# Patient Record
Sex: MALE | Race: WHITE | NOT HISPANIC OR LATINO | Employment: OTHER | URBAN - METROPOLITAN AREA
[De-identification: names, ages, dates, MRNs, and addresses within clinical notes are randomized per-mention and may not be internally consistent; named-entity substitution may affect disease eponyms.]

---

## 2017-07-24 NOTE — PATIENT DISCUSSION
The patient has a history of Herpes Simplex keratitis. Fortunately, there are no signs of viral activity or inflammation at this time. Periodic monitoring was recommended.

## 2017-09-19 NOTE — PATIENT DISCUSSION
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 Macular pucker is usually due to previous posterior vitreous detachment but can also be due to uveitis, retinal vascular occlusion, retinal tear, retinal detachment, and idiopathic. Most patients with epiretinal membranes with macular pucker do not progress to the point where intervention is needed. Intervention is typically surgical. The patient can be observed carefully with retinal follow-up in a number of months. If the maculopathy progresses, surgery will be considered. I will keep you informed of their progress.

## 2017-11-08 ENCOUNTER — IOP CHECK (OUTPATIENT)
Dept: URBAN - METROPOLITAN AREA CLINIC 39 | Facility: CLINIC | Age: 82
End: 2017-11-08

## 2017-11-08 DIAGNOSIS — H40.1133: ICD-10-CM

## 2017-11-08 PROCEDURE — G8785 BP SCRN NO PERF AT INTERVAL: HCPCS

## 2017-11-08 PROCEDURE — 3284F IOP RED >=15% PRE-NTRV LVL: CPT

## 2017-11-08 PROCEDURE — 2027F OPTIC NERVE HEAD EVAL DONE: CPT

## 2017-11-08 PROCEDURE — 92012 INTRM OPH EXAM EST PATIENT: CPT

## 2017-11-08 PROCEDURE — 1036F TOBACCO NON-USER: CPT

## 2017-11-08 PROCEDURE — G8428 CUR MEDS NOT DOCUMENT: HCPCS

## 2017-11-08 ASSESSMENT — VISUAL ACUITY
OD_CC: 20/40
OS_CC: 20/30-2
OD_CC: J6
OS_CC: J3
OU_CC: J2

## 2017-11-08 ASSESSMENT — TONOMETRY
OS_IOP_MMHG: 03
OD_IOP_MMHG: 05

## 2017-12-05 ENCOUNTER — EST. PATIENT EMERGENCY (OUTPATIENT)
Dept: URBAN - METROPOLITAN AREA CLINIC 39 | Facility: CLINIC | Age: 82
End: 2017-12-05

## 2017-12-05 DIAGNOSIS — H40.1133: ICD-10-CM

## 2017-12-05 PROCEDURE — G8427 DOCREV CUR MEDS BY ELIG CLIN: HCPCS

## 2017-12-05 PROCEDURE — 2027F OPTIC NERVE HEAD EVAL DONE: CPT

## 2017-12-05 PROCEDURE — 1036F TOBACCO NON-USER: CPT

## 2017-12-05 PROCEDURE — G8785 BP SCRN NO PERF AT INTERVAL: HCPCS

## 2017-12-05 PROCEDURE — 3284F IOP RED >=15% PRE-NTRV LVL: CPT

## 2017-12-05 PROCEDURE — 92012 INTRM OPH EXAM EST PATIENT: CPT

## 2017-12-05 ASSESSMENT — TONOMETRY
OD_IOP_MMHG: 05
OS_IOP_MMHG: 05

## 2017-12-05 ASSESSMENT — VISUAL ACUITY
OS_CC: 20/25-1
OD_CC: 20/30-2

## 2018-01-04 ENCOUNTER — FOLLOW UP (OUTPATIENT)
Dept: URBAN - METROPOLITAN AREA CLINIC 39 | Facility: CLINIC | Age: 83
End: 2018-01-04

## 2018-01-04 DIAGNOSIS — H04.123: ICD-10-CM

## 2018-01-04 PROCEDURE — 1036F TOBACCO NON-USER: CPT

## 2018-01-04 PROCEDURE — G8785 BP SCRN NO PERF AT INTERVAL: HCPCS

## 2018-01-04 PROCEDURE — 92012 INTRM OPH EXAM EST PATIENT: CPT

## 2018-01-04 PROCEDURE — G8427 DOCREV CUR MEDS BY ELIG CLIN: HCPCS

## 2018-01-04 ASSESSMENT — TONOMETRY
OS_IOP_MMHG: 05
OD_IOP_MMHG: 05

## 2018-01-04 ASSESSMENT — VISUAL ACUITY
OS_CC: 20/30-2
OD_CC: 20/40

## 2018-01-31 ENCOUNTER — ESTABLISHED PATIENT (OUTPATIENT)
Dept: URBAN - METROPOLITAN AREA CLINIC 39 | Facility: CLINIC | Age: 83
End: 2018-01-31

## 2018-01-31 DIAGNOSIS — H40.1133: ICD-10-CM

## 2018-01-31 PROCEDURE — 92012 INTRM OPH EXAM EST PATIENT: CPT

## 2018-01-31 PROCEDURE — 0517F GLAUCOMA PLAN OF CARE DOCD: CPT

## 2018-01-31 PROCEDURE — 92133 CPTRZD OPH DX IMG PST SGM ON: CPT

## 2018-01-31 PROCEDURE — 3285F IOP DOWN <15% OF PRE-SVC LVL: CPT

## 2018-01-31 PROCEDURE — G8428 CUR MEDS NOT DOCUMENT: HCPCS

## 2018-01-31 PROCEDURE — 2027F OPTIC NERVE HEAD EVAL DONE: CPT

## 2018-01-31 PROCEDURE — 1036F TOBACCO NON-USER: CPT

## 2018-01-31 PROCEDURE — G8785 BP SCRN NO PERF AT INTERVAL: HCPCS

## 2018-01-31 ASSESSMENT — TONOMETRY
OS_IOP_MMHG: 05
OD_IOP_MMHG: 06

## 2018-01-31 ASSESSMENT — VISUAL ACUITY
OD_SC: 20/70+2
OS_CC: 20/40
OD_CC: 20/40-2
OS_SC: 20/30
OS_PH: 20/25-2

## 2018-04-25 ENCOUNTER — IOP CHECK (OUTPATIENT)
Dept: URBAN - METROPOLITAN AREA CLINIC 39 | Facility: CLINIC | Age: 83
End: 2018-04-25

## 2018-04-25 DIAGNOSIS — H40.1133: ICD-10-CM

## 2018-04-25 PROCEDURE — 3284F IOP RED >=15% PRE-NTRV LVL: CPT

## 2018-04-25 PROCEDURE — 92012 INTRM OPH EXAM EST PATIENT: CPT

## 2018-04-25 PROCEDURE — 1036F TOBACCO NON-USER: CPT

## 2018-04-25 PROCEDURE — 2027F OPTIC NERVE HEAD EVAL DONE: CPT

## 2018-04-25 PROCEDURE — G8785 BP SCRN NO PERF AT INTERVAL: HCPCS

## 2018-04-25 PROCEDURE — 92083 EXTENDED VISUAL FIELD XM: CPT

## 2018-04-25 PROCEDURE — G8427 DOCREV CUR MEDS BY ELIG CLIN: HCPCS

## 2018-04-25 ASSESSMENT — VISUAL ACUITY
OD_CC: 20/40
OD_SC: 20/200
OS_CC: 20/25-1
OS_SC: 20/40-1

## 2018-04-25 ASSESSMENT — TONOMETRY
OS_IOP_MMHG: 04
OD_IOP_MMHG: 07

## 2018-10-24 ENCOUNTER — ESTABLISHED COMPREHENSIVE EXAM (OUTPATIENT)
Dept: URBAN - METROPOLITAN AREA CLINIC 39 | Facility: CLINIC | Age: 83
End: 2018-10-24

## 2018-10-24 DIAGNOSIS — H04.123: ICD-10-CM

## 2018-10-24 DIAGNOSIS — H35.371: ICD-10-CM

## 2018-10-24 DIAGNOSIS — Z96.1: ICD-10-CM

## 2018-10-24 DIAGNOSIS — H40.1133: ICD-10-CM

## 2018-10-24 PROCEDURE — G8428 CUR MEDS NOT DOCUMENT: HCPCS

## 2018-10-24 PROCEDURE — 9222650 BILAT EXTENDED OPHTHALMOSCOPY, F/U

## 2018-10-24 PROCEDURE — 2027F OPTIC NERVE HEAD EVAL DONE: CPT

## 2018-10-24 PROCEDURE — 1036F TOBACCO NON-USER: CPT

## 2018-10-24 PROCEDURE — G8785 BP SCRN NO PERF AT INTERVAL: HCPCS

## 2018-10-24 PROCEDURE — 92014 COMPRE OPH EXAM EST PT 1/>: CPT

## 2018-10-24 PROCEDURE — G9903 PT SCRN TBCO ID AS NON USER: HCPCS

## 2018-10-24 PROCEDURE — 92250 FUNDUS PHOTOGRAPHY W/I&R: CPT

## 2018-10-24 PROCEDURE — 3284F IOP RED >=15% PRE-NTRV LVL: CPT

## 2018-10-24 PROCEDURE — 92015 DETERMINE REFRACTIVE STATE: CPT

## 2018-10-24 ASSESSMENT — TONOMETRY
OD_IOP_MMHG: 08
OS_IOP_MMHG: 04

## 2018-10-24 ASSESSMENT — VISUAL ACUITY
OS_CC: 20/30
OS_SC: 20/25-1
OD_CC: 20/40+1
OD_SC: J10
OD_BAT: 20/70
OS_SC: J8
OS_CC: J3
OD_CC: J6
OD_SC: 20/80-1

## 2019-02-27 ENCOUNTER — IOP CHECK (OUTPATIENT)
Dept: URBAN - METROPOLITAN AREA CLINIC 39 | Facility: CLINIC | Age: 84
End: 2019-02-27

## 2019-02-27 DIAGNOSIS — H04.123: ICD-10-CM

## 2019-02-27 DIAGNOSIS — H40.1133: ICD-10-CM

## 2019-02-27 PROCEDURE — 92012 INTRM OPH EXAM EST PATIENT: CPT

## 2019-02-27 PROCEDURE — 92133 CPTRZD OPH DX IMG PST SGM ON: CPT

## 2019-02-27 ASSESSMENT — TONOMETRY
OS_IOP_MMHG: 08
OD_IOP_MMHG: 06

## 2019-02-27 ASSESSMENT — VISUAL ACUITY
OS_CC: 20/30+1
OD_CC: 20/40-2

## 2019-06-26 ENCOUNTER — IOP CHECK (OUTPATIENT)
Dept: URBAN - METROPOLITAN AREA CLINIC 39 | Facility: CLINIC | Age: 84
End: 2019-06-26

## 2019-06-26 DIAGNOSIS — H40.1133: ICD-10-CM

## 2019-06-26 PROCEDURE — 92012 INTRM OPH EXAM EST PATIENT: CPT

## 2019-06-26 PROCEDURE — 92083 EXTENDED VISUAL FIELD XM: CPT

## 2019-06-26 ASSESSMENT — VISUAL ACUITY
OD_CC: 20/30-2
OS_CC: 20/30

## 2019-06-26 ASSESSMENT — TONOMETRY
OD_IOP_MMHG: 08
OS_IOP_MMHG: 10

## 2019-10-23 ENCOUNTER — ESTABLISHED COMPREHENSIVE EXAM (OUTPATIENT)
Dept: URBAN - METROPOLITAN AREA CLINIC 39 | Facility: CLINIC | Age: 84
End: 2019-10-23

## 2019-10-23 DIAGNOSIS — H40.1133: ICD-10-CM

## 2019-10-23 DIAGNOSIS — H04.123: ICD-10-CM

## 2019-10-23 DIAGNOSIS — Z96.1: ICD-10-CM

## 2019-10-23 DIAGNOSIS — H35.371: ICD-10-CM

## 2019-10-23 PROCEDURE — 9222650 BILAT EXTENDED OPHTHALMOSCOPY, F/U

## 2019-10-23 PROCEDURE — 92014 COMPRE OPH EXAM EST PT 1/>: CPT

## 2019-10-23 PROCEDURE — 92250 FUNDUS PHOTOGRAPHY W/I&R: CPT

## 2019-10-23 RX ORDER — BRIMONIDINE TARTRATE 1 MG/ML
1 SOLUTION/ DROPS OPHTHALMIC TWICE A DAY
Start: 2019-10-23

## 2019-10-23 ASSESSMENT — VISUAL ACUITY
OS_CC: J3
OD_SC: J12
OS_CC: 20/30-2
OS_SC: J12
OD_CC: J6
OD_CC: 20/40+1

## 2019-10-23 ASSESSMENT — TONOMETRY
OS_IOP_MMHG: 13
OD_IOP_MMHG: 09

## 2019-11-20 ENCOUNTER — IOP CHECK (OUTPATIENT)
Dept: URBAN - METROPOLITAN AREA CLINIC 39 | Facility: CLINIC | Age: 84
End: 2019-11-20

## 2019-11-20 DIAGNOSIS — H40.1133: ICD-10-CM

## 2019-11-20 PROCEDURE — 92012 INTRM OPH EXAM EST PATIENT: CPT

## 2019-11-20 RX ORDER — BRINZOLAMIDE/BRIMONIDINE TARTRATE 10; 2 MG/ML; MG/ML
1 SUSPENSION/ DROPS OPHTHALMIC TWICE A DAY
Start: 2019-11-20

## 2019-11-20 ASSESSMENT — VISUAL ACUITY
OD_CC: 20/40+1
OS_CC: 20/30+2

## 2019-11-20 ASSESSMENT — TONOMETRY
OD_IOP_MMHG: 06
OS_IOP_MMHG: 14

## 2020-01-06 ENCOUNTER — IOP CHECK (OUTPATIENT)
Dept: URBAN - METROPOLITAN AREA CLINIC 39 | Facility: CLINIC | Age: 85
End: 2020-01-06

## 2020-01-06 DIAGNOSIS — H40.1133: ICD-10-CM

## 2020-01-06 PROCEDURE — 92012 INTRM OPH EXAM EST PATIENT: CPT

## 2020-01-06 ASSESSMENT — VISUAL ACUITY
OD_CC: 20/25-2
OS_CC: 20/25

## 2020-01-06 ASSESSMENT — TONOMETRY
OD_IOP_MMHG: 09
OS_IOP_MMHG: 12

## 2020-04-20 ENCOUNTER — EST. PATIENT EMERGENCY (OUTPATIENT)
Dept: URBAN - METROPOLITAN AREA CLINIC 39 | Facility: CLINIC | Age: 85
End: 2020-04-20

## 2020-04-20 DIAGNOSIS — H40.1133: ICD-10-CM

## 2020-04-20 PROCEDURE — 92012 INTRM OPH EXAM EST PATIENT: CPT

## 2020-04-20 RX ORDER — BRINZOLAMIDE 10 MG/ML
1 SUSPENSION/ DROPS OPHTHALMIC TWICE A DAY
Start: 2020-04-20

## 2020-04-20 ASSESSMENT — VISUAL ACUITY
OD_CC: 20/25
OS_CC: 20/30

## 2020-05-27 ENCOUNTER — IOP CHECK (OUTPATIENT)
Dept: URBAN - METROPOLITAN AREA CLINIC 39 | Facility: CLINIC | Age: 85
End: 2020-05-27

## 2020-05-27 DIAGNOSIS — H40.1133: ICD-10-CM

## 2020-05-27 PROCEDURE — 92012 INTRM OPH EXAM EST PATIENT: CPT

## 2020-05-27 PROCEDURE — 92133 CPTRZD OPH DX IMG PST SGM ON: CPT

## 2020-05-27 RX ORDER — DORZOLAMIDE HYDROCHLORIDE TIMOLOL MALEATE 20; 5 MG/ML; MG/ML: 1 SOLUTION/ DROPS OPHTHALMIC TWICE A DAY

## 2020-05-27 ASSESSMENT — VISUAL ACUITY
OS_CC: 20/30-1
OD_CC: 20/30-2

## 2020-05-27 ASSESSMENT — TONOMETRY
OD_IOP_MMHG: 04
OS_IOP_MMHG: 15

## 2020-06-18 NOTE — PATIENT DISCUSSION
Membrane is not visually significant. Refill request from patient     Last office visit: 10/24/19  Last refill:   escitalopram (LEXAPRO) 10 MG tablet 90 tablet 0 3/2/2020       Medication refilled per protocol.

## 2020-07-01 ENCOUNTER — IOP CHECK (OUTPATIENT)
Dept: URBAN - METROPOLITAN AREA CLINIC 39 | Facility: CLINIC | Age: 85
End: 2020-07-01

## 2020-07-01 DIAGNOSIS — H35.371: ICD-10-CM

## 2020-07-01 DIAGNOSIS — H40.1133: ICD-10-CM

## 2020-07-01 DIAGNOSIS — H04.123: ICD-10-CM

## 2020-07-01 DIAGNOSIS — Z96.1: ICD-10-CM

## 2020-07-01 PROCEDURE — 92012 INTRM OPH EXAM EST PATIENT: CPT

## 2020-07-01 ASSESSMENT — VISUAL ACUITY
OS_CC: 20/25-2
OD_CC: 20/30-2

## 2020-07-01 ASSESSMENT — TONOMETRY
OD_IOP_MMHG: 05
OS_IOP_MMHG: 11

## 2020-07-16 ENCOUNTER — ESTABLISHED COMPREHENSIVE EXAM (OUTPATIENT)
Dept: URBAN - METROPOLITAN AREA CLINIC 39 | Facility: CLINIC | Age: 85
End: 2020-07-16

## 2020-07-16 DIAGNOSIS — H35.371: ICD-10-CM

## 2020-07-16 DIAGNOSIS — H40.1133: ICD-10-CM

## 2020-07-16 DIAGNOSIS — H04.123: ICD-10-CM

## 2020-07-16 PROCEDURE — 92015 DETERMINE REFRACTIVE STATE: CPT

## 2020-07-16 PROCEDURE — 92014 COMPRE OPH EXAM EST PT 1/>: CPT

## 2020-07-16 ASSESSMENT — VISUAL ACUITY
OD_SC: J6-
OS_CC: 20/25-2
OS_SC: J6-
OS_CC: J1
OD_CC: J1
OD_CC: 20/30
OS_SC: 20/30+2
OD_SC: 20/100-1

## 2020-07-16 ASSESSMENT — TONOMETRY
OS_IOP_MMHG: 12
OD_IOP_MMHG: 10

## 2020-11-04 ENCOUNTER — IOP CHECK (OUTPATIENT)
Dept: URBAN - METROPOLITAN AREA CLINIC 39 | Facility: CLINIC | Age: 85
End: 2020-11-04

## 2020-11-04 DIAGNOSIS — H35.371: ICD-10-CM

## 2020-11-04 DIAGNOSIS — H40.1133: ICD-10-CM

## 2020-11-04 DIAGNOSIS — H04.123: ICD-10-CM

## 2020-11-04 DIAGNOSIS — Z96.1: ICD-10-CM

## 2020-11-04 PROCEDURE — 92083 EXTENDED VISUAL FIELD XM: CPT

## 2020-11-04 PROCEDURE — 92012 INTRM OPH EXAM EST PATIENT: CPT

## 2020-11-04 ASSESSMENT — TONOMETRY
OD_IOP_MMHG: 07
OS_IOP_MMHG: 11

## 2020-11-04 ASSESSMENT — VISUAL ACUITY
OD_CC: 20/25+1
OS_CC: 20/20-1

## 2021-03-24 ENCOUNTER — ESTABLISHED COMPREHENSIVE EXAM (OUTPATIENT)
Dept: URBAN - METROPOLITAN AREA CLINIC 39 | Facility: CLINIC | Age: 86
End: 2021-03-24

## 2021-03-24 DIAGNOSIS — H35.371: ICD-10-CM

## 2021-03-24 DIAGNOSIS — H40.1133: ICD-10-CM

## 2021-03-24 DIAGNOSIS — H04.123: ICD-10-CM

## 2021-03-24 DIAGNOSIS — Z96.1: ICD-10-CM

## 2021-03-24 PROCEDURE — 92014 COMPRE OPH EXAM EST PT 1/>: CPT

## 2021-03-24 PROCEDURE — 92250 FUNDUS PHOTOGRAPHY W/I&R: CPT

## 2021-03-24 ASSESSMENT — VISUAL ACUITY
OD_CC: 20/30-1
OS_SC: J6
OS_CC: J2-
OD_SC: 20/100
OS_SC: 20/25-1
OD_SC: J4
OD_CC: J2
OS_CC: 20/30

## 2021-03-24 ASSESSMENT — TONOMETRY
OD_IOP_MMHG: 09
OS_IOP_MMHG: 11

## 2021-07-28 ENCOUNTER — DILATED FUNDUS EXAM (OUTPATIENT)
Dept: URBAN - METROPOLITAN AREA CLINIC 39 | Facility: CLINIC | Age: 86
End: 2021-07-28

## 2021-07-28 DIAGNOSIS — H40.1133: ICD-10-CM

## 2021-07-28 PROCEDURE — 92012 INTRM OPH EXAM EST PATIENT: CPT

## 2021-07-28 PROCEDURE — 92202 OPSCPY EXTND ON/MAC DRAW: CPT

## 2021-07-28 PROCEDURE — 92133 CPTRZD OPH DX IMG PST SGM ON: CPT

## 2021-07-28 ASSESSMENT — TONOMETRY
OD_IOP_MMHG: 10
OS_IOP_MMHG: 11

## 2021-07-28 ASSESSMENT — VISUAL ACUITY
OS_CC: 20/25-2
OD_CC: 20/30-2

## 2021-10-21 ENCOUNTER — EST. PATIENT EMERGENCY (OUTPATIENT)
Dept: URBAN - METROPOLITAN AREA CLINIC 39 | Facility: CLINIC | Age: 86
End: 2021-10-21

## 2021-10-21 DIAGNOSIS — H40.1133: ICD-10-CM

## 2021-10-21 DIAGNOSIS — H10.12: ICD-10-CM

## 2021-10-21 DIAGNOSIS — H04.123: ICD-10-CM

## 2021-10-21 PROCEDURE — 92012 INTRM OPH EXAM EST PATIENT: CPT

## 2021-10-21 ASSESSMENT — TONOMETRY
OS_IOP_MMHG: 10
OD_IOP_MMHG: 10

## 2021-10-21 ASSESSMENT — VISUAL ACUITY
OD_CC: 20/30-2
OS_CC: 20/25-2

## 2021-11-11 ENCOUNTER — IOP CHECK (OUTPATIENT)
Dept: URBAN - METROPOLITAN AREA CLINIC 39 | Facility: CLINIC | Age: 86
End: 2021-11-11

## 2021-11-11 DIAGNOSIS — H40.1133: ICD-10-CM

## 2021-11-11 DIAGNOSIS — H04.123: ICD-10-CM

## 2021-11-11 DIAGNOSIS — H10.12: ICD-10-CM

## 2021-11-11 PROCEDURE — 92012 INTRM OPH EXAM EST PATIENT: CPT

## 2021-11-11 RX ORDER — TIMOLOL MALEATE 6.8 MG/ML: 1 SOLUTION OPHTHALMIC EVERY MORNING

## 2021-11-11 ASSESSMENT — VISUAL ACUITY
OS_CC: J1
OD_SC: J6
OS_SC: 20/50-2
OS_SC: J2
OD_CC: J1
OD_SC: 20/100
OD_CC: 20/30-2
OS_CC: 20/30-2

## 2021-11-11 ASSESSMENT — TONOMETRY
OD_IOP_MMHG: 10
OS_IOP_MMHG: 10

## 2021-12-01 ENCOUNTER — IOP CHECK (OUTPATIENT)
Dept: URBAN - METROPOLITAN AREA CLINIC 39 | Facility: CLINIC | Age: 86
End: 2021-12-01

## 2021-12-01 DIAGNOSIS — H04.123: ICD-10-CM

## 2021-12-01 DIAGNOSIS — H40.1133: ICD-10-CM

## 2021-12-01 DIAGNOSIS — H10.12: ICD-10-CM

## 2021-12-01 PROCEDURE — 92083 EXTENDED VISUAL FIELD XM: CPT

## 2021-12-01 PROCEDURE — 92012 INTRM OPH EXAM EST PATIENT: CPT

## 2021-12-01 ASSESSMENT — VISUAL ACUITY
OS_CC: 20/30
OD_CC: 20/30-2

## 2021-12-01 ASSESSMENT — TONOMETRY
OS_IOP_MMHG: 11
OD_IOP_MMHG: 09

## 2022-04-06 ENCOUNTER — COMPREHENSIVE EXAM (OUTPATIENT)
Dept: URBAN - METROPOLITAN AREA CLINIC 39 | Facility: CLINIC | Age: 87
End: 2022-04-06

## 2022-04-06 DIAGNOSIS — H35.371: ICD-10-CM

## 2022-04-06 DIAGNOSIS — Z96.1: ICD-10-CM

## 2022-04-06 DIAGNOSIS — H40.1133: ICD-10-CM

## 2022-04-06 DIAGNOSIS — H04.123: ICD-10-CM

## 2022-04-06 PROCEDURE — 92012 INTRM OPH EXAM EST PATIENT: CPT

## 2022-04-06 PROCEDURE — 92250 FUNDUS PHOTOGRAPHY W/I&R: CPT

## 2022-04-06 ASSESSMENT — VISUAL ACUITY
OD_CC: 20/30-2
OS_CC: 20/30-2
OD_CC: J4
OS_CC: J1
OS_SC: J6
OS_SC: 20/40-1
OD_SC: 20/200
OD_SC: J8

## 2022-04-06 ASSESSMENT — TONOMETRY
OS_IOP_MMHG: 13
OD_IOP_MMHG: 11

## 2022-08-10 ENCOUNTER — FOLLOW UP (OUTPATIENT)
Dept: URBAN - METROPOLITAN AREA CLINIC 39 | Facility: CLINIC | Age: 87
End: 2022-08-10

## 2022-08-10 DIAGNOSIS — H40.1133: ICD-10-CM

## 2022-08-10 PROCEDURE — 92202 OPSCPY EXTND ON/MAC DRAW: CPT

## 2022-08-10 PROCEDURE — 92012 INTRM OPH EXAM EST PATIENT: CPT

## 2022-08-10 PROCEDURE — 92133 CPTRZD OPH DX IMG PST SGM ON: CPT

## 2022-08-10 ASSESSMENT — VISUAL ACUITY
OS_CC: 20/25-1
OD_CC: 20/30

## 2022-08-10 ASSESSMENT — TONOMETRY
OD_IOP_MMHG: 09
OS_IOP_MMHG: 12

## 2023-01-31 ENCOUNTER — FOLLOW UP (OUTPATIENT)
Dept: URBAN - METROPOLITAN AREA CLINIC 39 | Facility: CLINIC | Age: 88
End: 2023-01-31

## 2023-01-31 DIAGNOSIS — H40.1133: ICD-10-CM

## 2023-01-31 PROCEDURE — 92083 EXTENDED VISUAL FIELD XM: CPT

## 2023-01-31 PROCEDURE — 92012 INTRM OPH EXAM EST PATIENT: CPT

## 2023-01-31 ASSESSMENT — VISUAL ACUITY
OS_CC: 20/20
OD_CC: 20/25-1

## 2023-01-31 ASSESSMENT — TONOMETRY
OS_IOP_MMHG: 10
OD_IOP_MMHG: 09

## 2023-05-31 ENCOUNTER — COMPREHENSIVE EXAM (OUTPATIENT)
Dept: URBAN - METROPOLITAN AREA CLINIC 39 | Facility: CLINIC | Age: 88
End: 2023-05-31

## 2023-05-31 DIAGNOSIS — H40.1133: ICD-10-CM

## 2023-05-31 PROCEDURE — 92250 FUNDUS PHOTOGRAPHY W/I&R: CPT

## 2023-05-31 PROCEDURE — 92014 COMPRE OPH EXAM EST PT 1/>: CPT

## 2023-05-31 ASSESSMENT — TONOMETRY
OS_IOP_MMHG: 09
OD_IOP_MMHG: 08

## 2023-05-31 ASSESSMENT — VISUAL ACUITY
OD_CC: J4
OD_SC: J8
OS_CC: J2
OD_SC: 20/100-1
OD_CC: 20/40
OS_SC: 20/40
OS_SC: J6
OS_CC: 20/30

## 2023-10-18 ENCOUNTER — FOLLOW UP (OUTPATIENT)
Dept: URBAN - METROPOLITAN AREA CLINIC 39 | Facility: CLINIC | Age: 88
End: 2023-10-18

## 2023-10-18 DIAGNOSIS — H40.1133: ICD-10-CM

## 2023-10-18 PROCEDURE — 92202 OPSCPY EXTND ON/MAC DRAW: CPT

## 2023-10-18 PROCEDURE — 92133 CPTRZD OPH DX IMG PST SGM ON: CPT

## 2023-10-18 PROCEDURE — 92012 INTRM OPH EXAM EST PATIENT: CPT

## 2023-10-18 ASSESSMENT — VISUAL ACUITY
OS_CC: 20/30
OD_CC: 20/40

## 2023-10-18 ASSESSMENT — TONOMETRY
OS_IOP_MMHG: 11
OD_IOP_MMHG: 09

## 2024-03-28 ENCOUNTER — COMPREHENSIVE EXAM (OUTPATIENT)
Dept: URBAN - METROPOLITAN AREA CLINIC 39 | Facility: CLINIC | Age: 89
End: 2024-03-28

## 2024-03-28 DIAGNOSIS — H35.371: ICD-10-CM

## 2024-03-28 DIAGNOSIS — H40.1133: ICD-10-CM

## 2024-03-28 DIAGNOSIS — H04.123: ICD-10-CM

## 2024-03-28 DIAGNOSIS — H52.7: ICD-10-CM

## 2024-03-28 PROCEDURE — 92014 COMPRE OPH EXAM EST PT 1/>: CPT

## 2024-03-28 PROCEDURE — 92015 DETERMINE REFRACTIVE STATE: CPT

## 2024-03-28 RX ORDER — BRIMONIDINE TARTRATE 2 MG/MG: 1 SOLUTION/ DROPS OPHTHALMIC TWICE A DAY

## 2024-03-28 ASSESSMENT — TONOMETRY
OD_IOP_MMHG: 14
OS_IOP_MMHG: 18
OS_IOP_MMHG: 19
OD_IOP_MMHG: 13

## 2024-03-28 ASSESSMENT — VISUAL ACUITY
OS_SC: 20/40
OD_CC: 20/40-2
OD_SC: 20/70-2
OS_SC: J2-
OS_CC: 20/30-2
OS_PH: 20/25+2
OD_PH: 20/40
OD_CC: J6
OD_SC: J8-
OS_CC: J1-

## 2024-04-15 ENCOUNTER — FOLLOW UP (OUTPATIENT)
Dept: URBAN - METROPOLITAN AREA CLINIC 39 | Facility: CLINIC | Age: 89
End: 2024-04-15

## 2024-04-15 DIAGNOSIS — H40.1133: ICD-10-CM

## 2024-04-15 DIAGNOSIS — H04.123: ICD-10-CM

## 2024-04-15 DIAGNOSIS — H35.371: ICD-10-CM

## 2024-04-15 PROCEDURE — 92012 INTRM OPH EXAM EST PATIENT: CPT

## 2024-04-15 ASSESSMENT — VISUAL ACUITY
OS_CC: 20/20-1
OD_CC: 20/25

## 2024-04-15 ASSESSMENT — TONOMETRY
OD_IOP_MMHG: 15
OS_IOP_MMHG: 15

## 2024-05-13 ENCOUNTER — FOLLOW UP (OUTPATIENT)
Dept: URBAN - METROPOLITAN AREA CLINIC 39 | Facility: CLINIC | Age: 89
End: 2024-05-13

## 2024-05-13 DIAGNOSIS — H40.1133: ICD-10-CM

## 2024-05-13 DIAGNOSIS — H04.123: ICD-10-CM

## 2024-05-13 DIAGNOSIS — H35.371: ICD-10-CM

## 2024-05-13 PROCEDURE — 92083 EXTENDED VISUAL FIELD XM: CPT

## 2024-05-13 PROCEDURE — 92012 INTRM OPH EXAM EST PATIENT: CPT

## 2024-05-13 ASSESSMENT — TONOMETRY
OD_IOP_MMHG: 12
OS_IOP_MMHG: 12

## 2024-05-13 ASSESSMENT — VISUAL ACUITY
OD_CC: 20/25-1
OS_CC: 20/25

## 2024-10-11 NOTE — PATIENT DISCUSSION
No Retinal holes, Tears or Detachments. October 11, 2024    Antoine Porras  [id number] [facility] [id number]             [facility]  [address] [facility] [address]  Phone: [phone]  Fax: [phone]   October 11, 2024     Patient: Antoine Porras   YOB: 2003   Date of Visit: 10/11/2024       To Whom it May Concern:    Antoine Porras was seen in my clinic on 10/11/2024. He may return to school on 10/16/24 .    Please excuse him from any classes or work missed.    If you have any questions or concerns, please don't hesitate to call.    Sincerely,         Isatu Jhaveri, NP